# Patient Record
Sex: FEMALE | Race: WHITE
[De-identification: names, ages, dates, MRNs, and addresses within clinical notes are randomized per-mention and may not be internally consistent; named-entity substitution may affect disease eponyms.]

---

## 2019-07-18 ENCOUNTER — HOSPITAL ENCOUNTER (OUTPATIENT)
Dept: HOSPITAL 88 - OR | Age: 54
Discharge: HOME | End: 2019-07-18
Attending: INTERNAL MEDICINE
Payer: COMMERCIAL

## 2019-07-18 VITALS — SYSTOLIC BLOOD PRESSURE: 137 MMHG | DIASTOLIC BLOOD PRESSURE: 61 MMHG

## 2019-07-18 DIAGNOSIS — E03.9: ICD-10-CM

## 2019-07-18 DIAGNOSIS — K64.8: ICD-10-CM

## 2019-07-18 DIAGNOSIS — E78.5: ICD-10-CM

## 2019-07-18 DIAGNOSIS — K29.70: Primary | ICD-10-CM

## 2019-07-18 DIAGNOSIS — K20.9: ICD-10-CM

## 2019-07-18 DIAGNOSIS — K63.89: ICD-10-CM

## 2019-07-18 DIAGNOSIS — K62.1: ICD-10-CM

## 2019-07-18 DIAGNOSIS — I10: ICD-10-CM

## 2019-07-18 DIAGNOSIS — D72.820: ICD-10-CM

## 2019-07-18 DIAGNOSIS — D12.0: ICD-10-CM

## 2019-07-18 DIAGNOSIS — Z01.810: ICD-10-CM

## 2019-07-18 DIAGNOSIS — K25.9: ICD-10-CM

## 2019-07-18 DIAGNOSIS — K31.7: ICD-10-CM

## 2019-07-18 PROCEDURE — 45384 COLONOSCOPY W/LESION REMOVAL: CPT

## 2019-07-18 PROCEDURE — 45385 COLONOSCOPY W/LESION REMOVAL: CPT

## 2019-07-18 PROCEDURE — 45380 COLONOSCOPY AND BIOPSY: CPT

## 2019-07-18 PROCEDURE — 93005 ELECTROCARDIOGRAM TRACING: CPT

## 2019-07-18 PROCEDURE — 43239 EGD BIOPSY SINGLE/MULTIPLE: CPT

## 2019-07-18 NOTE — OPERATIVE REPORT
DATE OF PROCEDURE:  07/18/2019

 

SURGEON:  Ten Agustin MD

 

PROCEDURES:  Esophagogastroduodenoscopy with biopsies and colonoscopy with polypectomy

and biopsies. 

 

INDICATIONS FOR EGD:  Dyspepsia.

 

INDICATIONS FOR COLONOSCOPY:  Surveillance colonoscopy, personal history of colon polyps.

 

MEDICATIONS:  The patient was done under MAC, please see anesthesiologist's note.

 

PROCEDURE IN DETAIL:  With the patient in left lateral decubitus position, flexible

fiberoptic Olympus gastroscope was introduced into the esophagus under direct

visualization without any difficulty.  There was some patchy erythema noted in distal

esophagus.  The scope was then advanced with ease into the stomach.  Mucosa overlying

the antrum and the body revealed some patchy intense erythema and mild-to-moderate edema

and biopsies were obtained and sent to stain for H pylori.  There was an ulcerated

nodule noted in the antrum that was biopsied.  The pylorus was of normal contour and

shape, it was intubated with ease and the scope was advanced all the way to the second

portion of the duodenum.  Biopsies were obtained from the second portion as well as the

duodenal bulb to rule out sprue.  The scope was then withdrawn back into the stomach and

retroflexed.  A minute polyp was noted in the fundus that was removed per the cold

biopsy forceps.  The scope was then straightened out it was subsequently withdrawn

patient tolerated procedure well. 

 

IMPRESSION:  

1. Mild distal esophagitis.

2. Gastritis, biopsied.  Biopsies sent to stain for Helicobacter pylori.

3. Ulcerated nodule, antrum biopsied.

4. Fundal polyp, partially excised with the cold biopsy forceps.

5. Rule out sprue.

 

PLAN:  Follow up histology.  Initiate Protonix 40 mg one p.o. q.a.m. a.c.

 

PROCEDURE IN DETAIL:  The patient was then turned around after adequate lubrication of

the anal canal, flexible fiberoptic Olympus colonoscope was inserted into the rectum

with ease and advanced all the way to the cecum.  One polyp was snared from the cecal

pouch and polypectomy site was hemoclipped.  The scope was then withdrawn slowly.

Mucosa overlying the ascending colon, transverse colon, and descending colon appeared to

be within normal limits.  A focal raised area was noted in the distal sigmoid colon and

that was biopsied.  Five polyps were removed per the hot biopsy forceps from the rectum.

 The scope was then retroflexed into the distal rectum.  Small internal hemorrhoids were

noted, none of which was actively bleeding.  The scope was then straightened out, it was

subsequently withdrawn.  The patient tolerated the procedure well. 

 

IMPRESSION:  

1. Cecal polyp snared, site hemoclipped.

2. Distal sigmoid colon, focal raised area, biopsied.

3. Rectal polyps x5, hot biopsied.

4. Internal hemorrhoids, none actively bleeding.

 

PLAN:  Followup histology.  Initiate high-fiber, low-fat diet.  Initiate high-fiber

supplement.  The patient might benefit from a followup colonoscopy in 3 to 5 years. 

 

 

 

 

______________________________

Ten Agustin MD

 

[facility]

D:  07/18/2019 13:01:13

T:  07/18/2019 13:58:47

Job #:  [id number]

 

cc:            Idris Myers DO

## 2019-07-18 NOTE — XMS REPORT
Patient Summary Document

                             Created on: 2019



EDMUND GARNICA

External Reference #: [id number]

: 1965

Sex: Female



Demographics







                          Address                   [address] [place] [address]

 

                          Home Phone                [phone]

 

                          Preferred Language        Unknown

 

                          Marital Status            Unknown

 

                          Orthodox Affiliation     Unknown

 

                          Race                      Unknown

 

                                        Additional Race(s)  

 

                          Ethnic Group              Unknown





Author







                          Author                    [facility]

 

                          Address                   Unknown

 

                          Phone                     Unavailable







Care Team Providers







                    Care Team Member Name    Role                Phone

 

                    VARINDER RESTREPO    Unavailable         Unavailable







Problems

This patient has no known problems.



Allergies, Adverse Reactions, Alerts

This patient has no known allergies or adverse reactions.



Medications

This patient has no known medications.



Results







           Test Description    Test Time    Test Comments    Text Results    Atomic Results    Result

 Comments

 

                [facility]                                        [place] [address]      Patient Name: EDMUND GARNICA 
 MR #: [id number]    : 1965 Age/Sex: 52/F  Acct #: [id number] Req #: 
[id number]  Adm Physician:     Ordered by: VARINDER RESTREPO MD  Report #: [id number]
[id number]   Location: [facility]  Room/Bed:     
_____________________________________________________________________________
______________________    Procedure: [id number] [facility]  Exam 
Date:                             Exam Time:        REPORT STATUS: Signed    
Hepatobiliary Scan with Gallbladder Ejection Fraction      Clinical information:
52F with progressively worsening chronic abdominal pain      Technique: 
Following intravenous administration of 6.4 millicuries of Tc-99m   mebrofenin, 
dynamic images of the abdomen in the anterior projection were   obtained through
30 minutes.  Sincalide (CCK analog) 2.0 micrograms was   administered 
intravenously over 30 minutes with additional imaging for   determination of 
gallbladder ejection fraction.      Discussion: Perfusion of the liver is 
normal.  Extraction of tracer by the   liver parenchyma is normal.  Tracer 
appears promptly within the biliary tract.    The gallbladder begins to fill  by
9 minutes post injection of tracer and fills   adequately.  Tracer is seen in 
the small bowel by 5 minutes.  The gallbladder   ejection fraction with 
sincalide is 3% (normal greater than 40%).      Impression:       1.  Filling of
the gallbladder excludes acute cystic duct obstruction/acute   cholecystitis.   
  2.  The decreased gallbladder ejection fraction of 3% supports the clinical   
diagnosis of chronic cholecystitis/gallbladder dyskinesia.      Signed by: Dr. Yuan King M.D. on 2017 3:11 PM        Dictated By: YUAN KING MD  
Electronically Signed By: YUAN KING MD on 17 1511  Transcribed By: 
SEFERINO on 17 1511       COPY TO:   VARINDER RESTREPO MD

## 2019-09-06 ENCOUNTER — HOSPITAL ENCOUNTER (OUTPATIENT)
Dept: HOSPITAL 88 - NM | Age: 54
End: 2019-09-06
Attending: INTERNAL MEDICINE
Payer: COMMERCIAL

## 2019-09-06 DIAGNOSIS — R10.11: Primary | ICD-10-CM

## 2019-09-06 PROCEDURE — 78227 HEPATOBIL SYST IMAGE W/DRUG: CPT

## 2019-09-06 NOTE — DIAGNOSTIC IMAGING REPORT
Hepatobiliary Scan with Gallbladder Ejection Fraction



Clinical information: RUQ abdominal pain



Technique: Following intravenous administration of 6.6 millicuries of Tc-99m

mebrofenin, dynamic images of the abdomen in the anterior projection were

obtained through 26 minutes.  Sincalide (CCK analog) 2.0 micrograms was

administered intravenously over 30 minutes with additional imaging for

determination of gallbladder ejection fraction.



Discussion: Perfusion of the liver is normal.  Extraction of tracer by the

liver parenchyma is normal.  Tracer appears promptly within the biliary tract. 

The gallbladder begins to fill at 16 minutes post injection of tracer and fills

adequately.  Tracer is seen in the small bowel by 8 minutes.  There is no

contractile response by the gallbladder to the pharmacologic dose of sincalide.

 No emptying of the gallbladder occurs during the 30 minute infusion.  



Impression: 



1.  Filling of the gallbladder excludes acute cystic duct obstruction/acute

cholecystitis.



2.  The gallbladder ejection fraction is undefined as there is no emptying of

the gallbladder during the infusion of sincalide.  This absence of a

contractile response to sincalide supports the clinical diagnosis of chronic

cholecystitis/gallbladder dyskinesia.



Signed by: Dr. Asia Andre M.D. on 9/6/2019 2:34 PM

## 2019-09-27 ENCOUNTER — HOSPITAL ENCOUNTER (OUTPATIENT)
Dept: HOSPITAL 88 - CT | Age: 54
End: 2019-09-27
Attending: SURGERY
Payer: COMMERCIAL

## 2019-09-27 DIAGNOSIS — K82.8: Primary | ICD-10-CM

## 2019-09-27 LAB
ALBUMIN SERPL-MCNC: 3.6 G/DL (ref 3.5–5)
ALBUMIN/GLOB SERPL: 0.8 {RATIO} (ref 0.8–2)
ALP SERPL-CCNC: 150 IU/L (ref 40–150)
ALT SERPL-CCNC: 116 IU/L (ref 0–55)
ANION GAP SERPL CALC-SCNC: 12.3 MMOL/L (ref 8–16)
BACTERIA URNS QL MICRO: (no result) /HPF
BILIRUB UR QL: NEGATIVE
BUN SERPL-MCNC: 10 MG/DL (ref 7–26)
BUN/CREAT SERPL: 12 (ref 6–25)
CALCIUM SERPL-MCNC: 9.8 MG/DL (ref 8.4–10.2)
CHLORIDE SERPL-SCNC: 97 MMOL/L (ref 98–107)
CLARITY UR: (no result)
CO2 SERPL-SCNC: 28 MMOL/L (ref 22–29)
COLOR UR: YELLOW
DEPRECATED RBC URNS MANUAL-ACNC: (no result) /HPF (ref 0–5)
EGFRCR SERPLBLD CKD-EPI 2021: > 60 ML/MIN (ref 60–?)
EPI CELLS URNS QL MICRO: (no result) /LPF
GLOBULIN PLAS-MCNC: 4.7 G/DL (ref 2.3–3.5)
GLUCOSE SERPLBLD-MCNC: 138 MG/DL (ref 74–118)
KETONES UR QL STRIP.AUTO: NEGATIVE
LEUKOCYTE ESTERASE UR QL STRIP.AUTO: (no result)
NITRITE UR QL STRIP.AUTO: NEGATIVE
POTASSIUM SERPL-SCNC: 3.3 MMOL/L (ref 3.5–5.1)
PROT UR QL STRIP.AUTO: (no result)
SODIUM SERPL-SCNC: 134 MMOL/L (ref 136–145)
SP GR UR STRIP: 1.02 (ref 1.01–1.02)
UROBILINOGEN UR STRIP-MCNC: 0.2 MG/DL (ref 0.2–1)
WBC #/AREA URNS HPF: (no result) /HPF (ref 0–5)

## 2019-09-27 PROCEDURE — 80053 COMPREHEN METABOLIC PANEL: CPT

## 2019-09-27 PROCEDURE — 81001 URINALYSIS AUTO W/SCOPE: CPT

## 2019-09-27 PROCEDURE — 93005 ELECTROCARDIOGRAM TRACING: CPT

## 2019-09-27 PROCEDURE — 71046 X-RAY EXAM CHEST 2 VIEWS: CPT

## 2019-09-27 PROCEDURE — 36415 COLL VENOUS BLD VENIPUNCTURE: CPT

## 2019-09-27 PROCEDURE — 74177 CT ABD & PELVIS W/CONTRAST: CPT

## 2019-09-27 PROCEDURE — 85025 COMPLETE CBC W/AUTO DIFF WBC: CPT

## 2019-09-27 NOTE — DIAGNOSTIC IMAGING REPORT
CT of the abdomen and pelvis, with contrast, 9/27/2019.    





History: Biliary dyskinesia.



Comparison: None available.



Technique: Multidetector CT scanning of the abdomen and pelvis was performed

from the level of the lung bases to the inferior pubic rami after intravenous

and oral administration of contrast.  Coronal and sagittal multiplanar

reformations were obtained.





RADIATION DOSE:

     Total DLP: 796 mGy*cm

     Dose modulation, iterative reconstruction, and/or weight based adjustment

of the mA/kV was utilized to reduce the radiation dose to as low as reasonably

achievable. 



Discussion: 

LUNG BASES: There is right basilar atelectasis.



ABDOMEN: There is no evidence of cholelithiasis or gallbladder wall thickening.

The gallbladder measures 4.6 cm in diameter. Calcified granuloma are present

within the liver and spleen. The biliary tree, pancreas, adrenal glands, and

kidneys are normal. The hepatic vein, portal vein, and splenic vein are patent.

 The abdominal aorta is within normal limits for size.

      

The stomach, small bowel, and large bowel are unremarkable. A 7 mm

calcification is present at the base of the appendix, but the remainder of the

appendix opacifies with contrast and is normal in size without adjacent

inflammation.  There is no evidence of adenopathy or free fluid.   



PELVIS: The bladder, uterus, and adnexa are normal in appearance. There is no

evidence of free fluid or adenopathy. 



BONES AND SOFT TISSUES: Mild degenerative changes are present throughout the

lumbar spine without evidence of lytic or sclerotic lesion.





IMPRESSION:



1. Findings of previous granulomatous disease within the liver and spleen.

2. No evidence of cholelithiasis or gallbladder wall thickening. 

3. Small calcification of the base of the appendix, but no evidence of

appendicitis.



Signed by: Pablito Javier on 9/27/2019 1:50 PM

## 2019-09-27 NOTE — DIAGNOSTIC IMAGING REPORT
Chest, 2 views,  9/27/2019.   

 



History: Preop, cholecystectomy.



Comparison: None available.



Findings: The cardiomediastinal silhouette and pulmonary vasculature are within

normal limits. The lungs are clear without evidence of consolidation or pleural

effusion. Mild degenerative changes are present within the thoracic spine.

There are no acute osseous or soft tissue abnormalities. 



Impression: 

No acute cardiopulmonary abnormality.



Signed by: Pablito Javier on 9/27/2019 11:13 AM

## 2019-10-02 LAB
BASOPHILS # BLD AUTO: 0.1 10*3/UL (ref 0–0.1)
BASOPHILS NFR BLD AUTO: 1.2 % (ref 0–1)
DEPRECATED NEUTROPHILS # BLD AUTO: 5.3 10*3/UL (ref 2.1–6.9)
EOSINOPHIL # BLD AUTO: 0.6 10*3/UL (ref 0–0.4)
EOSINOPHIL NFR BLD AUTO: 5.8 % (ref 0–6)
ERYTHROCYTE [DISTWIDTH] IN CORD BLOOD: 14.2 % (ref 11.7–14.4)
HCT VFR BLD AUTO: 40.2 % (ref 34.2–44.1)
HGB BLD-MCNC: 12.3 G/DL (ref 12–16)
LYMPHOCYTES # BLD: 2.9 10*3/UL (ref 1–3.2)
LYMPHOCYTES NFR BLD AUTO: 30.1 % (ref 18–39.1)
MCH RBC QN AUTO: 26.7 PG (ref 28–32)
MCHC RBC AUTO-ENTMCNC: 30.6 G/DL (ref 31–35)
MCV RBC AUTO: 87.2 FL (ref 81–99)
MONOCYTES # BLD AUTO: 0.6 10*3/UL (ref 0.2–0.8)
MONOCYTES NFR BLD AUTO: 6.1 % (ref 4.4–11.3)
NEUTS SEG NFR BLD AUTO: 56.3 % (ref 38.7–80)
PLATELET # BLD AUTO: 228 X10E3/UL (ref 140–360)
RBC # BLD AUTO: 4.61 X10E6/UL (ref 3.6–5.1)

## 2019-10-04 ENCOUNTER — HOSPITAL ENCOUNTER (INPATIENT)
Dept: HOSPITAL 88 - OR | Age: 54
LOS: 2 days | Discharge: HOME | DRG: 418 | End: 2019-10-06
Attending: SURGERY | Admitting: SURGERY
Payer: COMMERCIAL

## 2019-10-04 VITALS — DIASTOLIC BLOOD PRESSURE: 57 MMHG | SYSTOLIC BLOOD PRESSURE: 90 MMHG

## 2019-10-04 VITALS — HEIGHT: 62 IN | WEIGHT: 226.01 LBS | BODY MASS INDEX: 41.59 KG/M2

## 2019-10-04 VITALS — DIASTOLIC BLOOD PRESSURE: 62 MMHG | SYSTOLIC BLOOD PRESSURE: 108 MMHG

## 2019-10-04 VITALS — SYSTOLIC BLOOD PRESSURE: 108 MMHG | DIASTOLIC BLOOD PRESSURE: 62 MMHG

## 2019-10-04 VITALS — SYSTOLIC BLOOD PRESSURE: 112 MMHG | DIASTOLIC BLOOD PRESSURE: 76 MMHG

## 2019-10-04 VITALS — DIASTOLIC BLOOD PRESSURE: 76 MMHG | SYSTOLIC BLOOD PRESSURE: 112 MMHG

## 2019-10-04 DIAGNOSIS — K76.0: ICD-10-CM

## 2019-10-04 DIAGNOSIS — K81.1: Primary | ICD-10-CM

## 2019-10-04 DIAGNOSIS — E78.5: ICD-10-CM

## 2019-10-04 DIAGNOSIS — E66.9: ICD-10-CM

## 2019-10-04 DIAGNOSIS — I10: ICD-10-CM

## 2019-10-04 DIAGNOSIS — K21.9: ICD-10-CM

## 2019-10-04 LAB
DEPRECATED APTT PLAS QN: 31.3 SECONDS (ref 23.8–35.5)
DEPRECATED INR PLAS: 1.1
HCT VFR BLD AUTO: 34.5 % (ref 34.2–44.1)
HCT VFR BLD AUTO: 36.8 % (ref 34.2–44.1)
HGB BLD-MCNC: 10.9 G/DL (ref 12–16)
HGB BLD-MCNC: 11.4 G/DL (ref 12–16)
PROTHROMBIN TIME: 14.7 SECONDS (ref 11.9–14.5)

## 2019-10-04 PROCEDURE — 85730 THROMBOPLASTIN TIME PARTIAL: CPT

## 2019-10-04 PROCEDURE — 86900 BLOOD TYPING SEROLOGIC ABO: CPT

## 2019-10-04 PROCEDURE — 88307 TISSUE EXAM BY PATHOLOGIST: CPT

## 2019-10-04 PROCEDURE — 88304 TISSUE EXAM BY PATHOLOGIST: CPT

## 2019-10-04 PROCEDURE — 0FT44ZZ RESECTION OF GALLBLADDER, PERCUTANEOUS ENDOSCOPIC APPROACH: ICD-10-PCS | Performed by: SURGERY

## 2019-10-04 PROCEDURE — 85014 HEMATOCRIT: CPT

## 2019-10-04 PROCEDURE — 80053 COMPREHEN METABOLIC PANEL: CPT

## 2019-10-04 PROCEDURE — 36415 COLL VENOUS BLD VENIPUNCTURE: CPT

## 2019-10-04 PROCEDURE — 85610 PROTHROMBIN TIME: CPT

## 2019-10-04 PROCEDURE — 85018 HEMOGLOBIN: CPT

## 2019-10-04 PROCEDURE — 85025 COMPLETE CBC W/AUTO DIFF WBC: CPT

## 2019-10-04 PROCEDURE — 86850 RBC ANTIBODY SCREEN: CPT

## 2019-10-04 RX ADMIN — PRAMIPEXOLE DIHYDROCHLORIDE SCH MG: 0.25 TABLET ORAL at 17:40

## 2019-10-04 RX ADMIN — SODIUM CHLORIDE SCH MG: 900 INJECTION INTRAVENOUS at 12:45

## 2019-10-04 RX ADMIN — PRAMIPEXOLE DIHYDROCHLORIDE SCH MG: 0.25 TABLET ORAL at 20:35

## 2019-10-04 RX ADMIN — CEFOXITIN SCH MLS/HR: 1 INJECTION, POWDER, FOR SOLUTION INTRAVENOUS at 23:30

## 2019-10-04 RX ADMIN — HYDROMORPHONE HYDROCHLORIDE PRN MG: 1 INJECTION, SOLUTION INTRAMUSCULAR; INTRAVENOUS; SUBCUTANEOUS at 19:13

## 2019-10-04 RX ADMIN — SODIUM CHLORIDE SCH MLS/HR: 9 INJECTION, SOLUTION INTRAVENOUS at 12:35

## 2019-10-04 RX ADMIN — SODIUM CHLORIDE SCH MLS/HR: 9 INJECTION, SOLUTION INTRAVENOUS at 20:35

## 2019-10-04 RX ADMIN — SODIUM CHLORIDE PRN MG: 900 INJECTION INTRAVENOUS at 19:14

## 2019-10-04 RX ADMIN — CEFOXITIN SCH MLS/HR: 1 INJECTION, POWDER, FOR SOLUTION INTRAVENOUS at 17:40

## 2019-10-04 NOTE — OPERATIVE REPORT
DATE OF PROCEDURE:  10/04/2019

 

SURGEON:  Hiro Chan MD

 

PREOPERATIVE DIAGNOSES:  

1. Chronic acalculous cholecystitis.

2. Biliary dyskinesia.

3. Morbid obesity.

4. Fatty liver infiltration.

 

POSTOPERATIVE DIAGNOSES:  

1. Chronic acalculous cholecystitis.

2. Biliary dyskinesia.

3. Morbid obesity.

4. Fatty liver infiltration.

5. Possible cirrhosis of the liver.

 

PROCEDURE PERFORMED:  Laparoscopic cholecystectomy with liver biopsy.

 

ANESTHESIA:  General.

 

ESTIMATED BLOOD LOSS:  Minimal.

 

DRAINS:  None.

 

COMPLICATIONS:  None.

 

INDICATION AND FINDINGS:  A 54-year-old morbidly obese female admitted for

cholecystectomy because of right upper quadrant pain, persistent.  She underwent a GI

workup by Dr. Ten Agustin and no source of pain was found.  She had an EGD.  She had

an ultrasound that revealed no evidence of gallstones, normal ductal anatomy.  CT scan

revealed no source of her pain, some chronic granulomatous disease of the liver.  In

addition to that, she underwent a HIDA scan that revealed a severe ejection fraction.

Her liver chemistries showed minimal elevation of the transaminases. 

 

INTRAOPERATIVE FINDINGS:  Grossly normal looking gallbladder  __________.  There was no

ductal dilatation.  The liver was grossly abnormal with nodularity and fatty liver

infiltration, consistent with severe hepatic steatosis and possibly cirrhotic

degeneration.  Hepatomegaly with thickened and heavy stiff liver. 

 

DESCRIPTION OF PROCEDURE:  With the patient lying on the operative table in the supine

position after administration of general anesthesia, she was prepped and draped for

laparoscopic cholecystectomy.  Because of her large size, we had to insufflate and

started pneumoperitoneum in the right upper quadrant midclavicular line, after which we

inflated the pneumoperitoneum to 15 mm of pressure.  We placed a 5 mm trocar there and

then placed a 10/11 trocar in the umbilical site.  We placed a 10 mm subxiphoid port

also and then a right anterior axillary line trocar was also placed.  Finally, we had to

place a left upper quadrant 5 mm trocar in order to expose the operative field because

of the large size of her liver with heavy and stiff liver.  We went ahead and then

exposed the hepatorenal ligament by traction on the gallbladder fundus and the neck, and

then in the stomach and colon with extra trocar site and we had to decompress the

gallbladder because it was tense with bile and then exposed until we were able to

identify the cystic duct.  We also identified the common bile duct and the cystic artery

and part of the liver bed fossa.  At that point, we went ahead and then transected the

cystic duct between titanium clips and also the cystic artery and then we took the

gallbladder slowly and meticulous from the gallbladder bed fossa.  The dissection was

tedious because of the stiffness and the large size of the liver, but we were able to

complete the procedure.  After we did that, we removed the gallbladder through the

umbilical port.  We irrigated the right upper quadrant and the gallbladder bed fossa and

then we saw there was some oozing coming from the gallbladder bed.  We cauterized that

and then we had some brisk venous bleeding coming from the gallbladder bed fossa and

then we had to stop the bleeding by putting multiple Surgicels and putting pressure on

the gallbladder bed fossa until the bleeding was stopped.  We took at least minimum of

45 minutes if not longer to control the bleeding, but we were able to control it with

pressure and then electrocautery.  Once we were satisfied that the gallbladder bed fossa

was no longer bleeding after observing the patient for several minutes, after we had

released all the tension, then we suctioned out the blood, placed a 10 mm flat

London-Serna drain in the right upper quadrant and foramen of Judy and brought out

through the right anterior axillary line trocar and secured there with 2-0 silk,

connected to self suction, and then we closed the umbilical site trocar with 0 Vicryl

and the subcutaneous tissue was closed in that location with 3-0 Vicryl.  The subxiphoid

and the skin of all the ports were closed using staples.  0.25% Marcaine with

epinephrine was given as local block at the end of the case.  The patient's  was

informed 

of the intraoperative findings and the patient will be admitted to the hospital to [facility]

for careful monitoring.  She had been typed and cross-matched and will be followed up

carefully. 

 

 

 

 

______________________________

MD SHERLYN Mart/[facility]

D:  10/04/2019 12:54:34

T:  10/04/2019 13:53:30

Job #:  [id number]

## 2019-10-05 VITALS — DIASTOLIC BLOOD PRESSURE: 74 MMHG | SYSTOLIC BLOOD PRESSURE: 129 MMHG

## 2019-10-05 VITALS — SYSTOLIC BLOOD PRESSURE: 115 MMHG | DIASTOLIC BLOOD PRESSURE: 79 MMHG

## 2019-10-05 VITALS — DIASTOLIC BLOOD PRESSURE: 78 MMHG | SYSTOLIC BLOOD PRESSURE: 115 MMHG

## 2019-10-05 VITALS — DIASTOLIC BLOOD PRESSURE: 61 MMHG | SYSTOLIC BLOOD PRESSURE: 95 MMHG

## 2019-10-05 VITALS — SYSTOLIC BLOOD PRESSURE: 92 MMHG | DIASTOLIC BLOOD PRESSURE: 62 MMHG

## 2019-10-05 VITALS — DIASTOLIC BLOOD PRESSURE: 79 MMHG | SYSTOLIC BLOOD PRESSURE: 115 MMHG

## 2019-10-05 VITALS — DIASTOLIC BLOOD PRESSURE: 65 MMHG | SYSTOLIC BLOOD PRESSURE: 113 MMHG

## 2019-10-05 VITALS — SYSTOLIC BLOOD PRESSURE: 95 MMHG | DIASTOLIC BLOOD PRESSURE: 61 MMHG

## 2019-10-05 LAB
ALBUMIN SERPL-MCNC: 2.8 G/DL (ref 3.5–5)
ALBUMIN/GLOB SERPL: 0.8 {RATIO} (ref 0.8–2)
ALP SERPL-CCNC: 86 IU/L (ref 40–150)
ALT SERPL-CCNC: 114 IU/L (ref 0–55)
ANION GAP SERPL CALC-SCNC: 15.2 MMOL/L (ref 8–16)
BASOPHILS # BLD AUTO: 0.1 10*3/UL (ref 0–0.1)
BASOPHILS NFR BLD AUTO: 0.5 % (ref 0–1)
BUN SERPL-MCNC: 10 MG/DL (ref 7–26)
BUN/CREAT SERPL: 14 (ref 6–25)
CALCIUM SERPL-MCNC: 8.6 MG/DL (ref 8.4–10.2)
CHLORIDE SERPL-SCNC: 107 MMOL/L (ref 98–107)
CO2 SERPL-SCNC: 20 MMOL/L (ref 22–29)
DEPRECATED NEUTROPHILS # BLD AUTO: 10.2 10*3/UL (ref 2.1–6.9)
EGFRCR SERPLBLD CKD-EPI 2021: > 60 ML/MIN (ref 60–?)
EOSINOPHIL # BLD AUTO: 0 10*3/UL (ref 0–0.4)
EOSINOPHIL NFR BLD AUTO: 0.3 % (ref 0–6)
ERYTHROCYTE [DISTWIDTH] IN CORD BLOOD: 14.5 % (ref 11.7–14.4)
GLOBULIN PLAS-MCNC: 3.5 G/DL (ref 2.3–3.5)
GLUCOSE SERPLBLD-MCNC: 105 MG/DL (ref 74–118)
HCT VFR BLD AUTO: 35.7 % (ref 34.2–44.1)
HCT VFR BLD AUTO: 36 % (ref 34.2–44.1)
HGB BLD-MCNC: 10.5 G/DL (ref 12–16)
HGB BLD-MCNC: 10.9 G/DL (ref 12–16)
LYMPHOCYTES # BLD: 2.8 10*3/UL (ref 1–3.2)
LYMPHOCYTES NFR BLD AUTO: 19.7 % (ref 18–39.1)
MCH RBC QN AUTO: 27.1 PG (ref 28–32)
MCHC RBC AUTO-ENTMCNC: 29.2 G/DL (ref 31–35)
MCV RBC AUTO: 93 FL (ref 81–99)
MONOCYTES # BLD AUTO: 1 10*3/UL (ref 0.2–0.8)
MONOCYTES NFR BLD AUTO: 6.8 % (ref 4.4–11.3)
NEUTS SEG NFR BLD AUTO: 72.1 % (ref 38.7–80)
PLATELET # BLD AUTO: 169 X10E3/UL (ref 140–360)
POTASSIUM SERPL-SCNC: 4.2 MMOL/L (ref 3.5–5.1)
RBC # BLD AUTO: 3.87 X10E6/UL (ref 3.6–5.1)
SODIUM SERPL-SCNC: 138 MMOL/L (ref 136–145)

## 2019-10-05 RX ADMIN — HYDROMORPHONE HYDROCHLORIDE PRN MG: 1 INJECTION, SOLUTION INTRAMUSCULAR; INTRAVENOUS; SUBCUTANEOUS at 19:12

## 2019-10-05 RX ADMIN — HYDROCODONE BITARTRATE AND ACETAMINOPHEN PRN EA: 7.5; 325 TABLET ORAL at 23:10

## 2019-10-05 RX ADMIN — HYDROMORPHONE HYDROCHLORIDE PRN MG: 1 INJECTION, SOLUTION INTRAMUSCULAR; INTRAVENOUS; SUBCUTANEOUS at 05:41

## 2019-10-05 RX ADMIN — SODIUM CHLORIDE PRN MG: 900 INJECTION INTRAVENOUS at 19:12

## 2019-10-05 RX ADMIN — PRAMIPEXOLE DIHYDROCHLORIDE SCH MG: 0.25 TABLET ORAL at 08:33

## 2019-10-05 RX ADMIN — LEVOTHYROXINE SODIUM SCH MCG: 100 TABLET ORAL at 05:41

## 2019-10-05 RX ADMIN — HYDROMORPHONE HYDROCHLORIDE PRN MG: 1 INJECTION, SOLUTION INTRAMUSCULAR; INTRAVENOUS; SUBCUTANEOUS at 12:25

## 2019-10-05 RX ADMIN — PRAMIPEXOLE DIHYDROCHLORIDE SCH MG: 0.25 TABLET ORAL at 17:32

## 2019-10-05 RX ADMIN — SODIUM CHLORIDE SCH MLS/HR: 9 INJECTION, SOLUTION INTRAVENOUS at 23:11

## 2019-10-05 RX ADMIN — PRAMIPEXOLE DIHYDROCHLORIDE SCH MG: 0.25 TABLET ORAL at 13:28

## 2019-10-05 RX ADMIN — PRAMIPEXOLE DIHYDROCHLORIDE SCH MG: 0.25 TABLET ORAL at 21:04

## 2019-10-05 RX ADMIN — SODIUM CHLORIDE PRN MG: 900 INJECTION INTRAVENOUS at 05:41

## 2019-10-05 RX ADMIN — HYDROMORPHONE HYDROCHLORIDE PRN MG: 1 INJECTION, SOLUTION INTRAMUSCULAR; INTRAVENOUS; SUBCUTANEOUS at 00:49

## 2019-10-05 RX ADMIN — SODIUM CHLORIDE PRN MG: 900 INJECTION INTRAVENOUS at 00:49

## 2019-10-05 RX ADMIN — SODIUM CHLORIDE SCH MLS/HR: 9 INJECTION, SOLUTION INTRAVENOUS at 05:41

## 2019-10-05 RX ADMIN — SODIUM CHLORIDE SCH MLS/HR: 9 INJECTION, SOLUTION INTRAVENOUS at 13:28

## 2019-10-05 RX ADMIN — SODIUM CHLORIDE SCH MG: 900 INJECTION INTRAVENOUS at 13:28

## 2019-10-05 RX ADMIN — SODIUM CHLORIDE PRN MG: 900 INJECTION INTRAVENOUS at 12:25

## 2019-10-06 VITALS — SYSTOLIC BLOOD PRESSURE: 121 MMHG | DIASTOLIC BLOOD PRESSURE: 58 MMHG

## 2019-10-06 VITALS — DIASTOLIC BLOOD PRESSURE: 77 MMHG | SYSTOLIC BLOOD PRESSURE: 120 MMHG

## 2019-10-06 VITALS — SYSTOLIC BLOOD PRESSURE: 120 MMHG | DIASTOLIC BLOOD PRESSURE: 77 MMHG

## 2019-10-06 VITALS — SYSTOLIC BLOOD PRESSURE: 117 MMHG | DIASTOLIC BLOOD PRESSURE: 41 MMHG

## 2019-10-06 VITALS — SYSTOLIC BLOOD PRESSURE: 118 MMHG | DIASTOLIC BLOOD PRESSURE: 68 MMHG

## 2019-10-06 LAB
BASOPHILS # BLD AUTO: 0.1 10*3/UL (ref 0–0.1)
BASOPHILS NFR BLD AUTO: 0.8 % (ref 0–1)
DEPRECATED NEUTROPHILS # BLD AUTO: 6.1 10*3/UL (ref 2.1–6.9)
EOSINOPHIL # BLD AUTO: 0.2 10*3/UL (ref 0–0.4)
EOSINOPHIL NFR BLD AUTO: 2.3 % (ref 0–6)
ERYTHROCYTE [DISTWIDTH] IN CORD BLOOD: 14.6 % (ref 11.7–14.4)
HCT VFR BLD AUTO: 32.8 % (ref 34.2–44.1)
HGB BLD-MCNC: 10.2 G/DL (ref 12–16)
LYMPHOCYTES # BLD: 2.3 10*3/UL (ref 1–3.2)
LYMPHOCYTES NFR BLD AUTO: 24.5 % (ref 18–39.1)
MCH RBC QN AUTO: 27.1 PG (ref 28–32)
MCHC RBC AUTO-ENTMCNC: 31.1 G/DL (ref 31–35)
MCV RBC AUTO: 87 FL (ref 81–99)
MONOCYTES # BLD AUTO: 0.6 10*3/UL (ref 0.2–0.8)
MONOCYTES NFR BLD AUTO: 5.9 % (ref 4.4–11.3)
NEUTS SEG NFR BLD AUTO: 64.8 % (ref 38.7–80)
PLATELET # BLD AUTO: 214 X10E3/UL (ref 140–360)
RBC # BLD AUTO: 3.77 X10E6/UL (ref 3.6–5.1)

## 2019-10-06 RX ADMIN — PRAMIPEXOLE DIHYDROCHLORIDE SCH MG: 0.25 TABLET ORAL at 13:19

## 2019-10-06 RX ADMIN — PRAMIPEXOLE DIHYDROCHLORIDE SCH MG: 0.25 TABLET ORAL at 08:55

## 2019-10-06 RX ADMIN — SODIUM CHLORIDE SCH MG: 900 INJECTION INTRAVENOUS at 13:19

## 2019-10-06 RX ADMIN — LEVOTHYROXINE SODIUM SCH MCG: 100 TABLET ORAL at 05:56

## 2019-10-06 RX ADMIN — HYDROCODONE BITARTRATE AND ACETAMINOPHEN PRN EA: 7.5; 325 TABLET ORAL at 05:56

## 2020-03-06 ENCOUNTER — HOSPITAL ENCOUNTER (OUTPATIENT)
Dept: HOSPITAL 88 - US | Age: 55
End: 2020-03-06
Attending: INTERNAL MEDICINE
Payer: COMMERCIAL

## 2020-03-06 DIAGNOSIS — R10.9: Primary | ICD-10-CM

## 2020-03-06 DIAGNOSIS — K75.81: ICD-10-CM

## 2020-03-06 LAB
BUN SERPL-MCNC: 10 MG/DL (ref 7–26)
BUN/CREAT SERPL: 13 (ref 6–25)
EGFRCR SERPLBLD CKD-EPI 2021: > 60 ML/MIN (ref 60–?)

## 2020-03-06 PROCEDURE — 74177 CT ABD & PELVIS W/CONTRAST: CPT

## 2020-03-06 PROCEDURE — 36415 COLL VENOUS BLD VENIPUNCTURE: CPT

## 2020-03-06 PROCEDURE — 82565 ASSAY OF CREATININE: CPT

## 2020-03-06 PROCEDURE — 76700 US EXAM ABDOM COMPLETE: CPT

## 2020-03-06 PROCEDURE — 84520 ASSAY OF UREA NITROGEN: CPT

## 2020-03-06 NOTE — DIAGNOSTIC IMAGING REPORT
Abdominal ultrasound.





History: Right-sided abdominal pain.



Comparison: CT abdomen 9/27/2019.



Discussion: Transverse and longitudinal images of the abdomen were obtained

demonstrating a liver of increased size but normal echogenicity measuring 18 cm

in length.  There is no focal hepatic abnormality. The portal vein is patent

with hepatopetal flow and is within normal limits measuring 11 mm in diameter. 



          

The biliary tree is within normal limits with the common bile duct measuring 3

mm in diameter. The gallbladder is absent. 

          

The kidneys are normal in size and echogenicity bilaterally without evidence of

hydronephrosis, stones, or mass. The right kidney measures 11 cm and the left

kidney measures 11.1 cm in length. 



The spleen is normal in size and appearance measuring 12.3 cm in length.  The

pancreatic head and body are visualized and are normal in appearance. The

abdominal aorta and IVC are within normal limits.  There is no evidence of free

fluid.





IMPRESSION: 



1. Mild hepatomegaly without focal hepatic abnormality.

2. Status post post cholecystectomy. Otherwise unremarkable abdominal

ultrasound.



 



Signed by: Pablito Javier on 3/6/2020 1:14 PM

## 2020-03-06 NOTE — DIAGNOSTIC IMAGING REPORT
CT of the abdomen and pelvis, with contrast, 3/6/2020.    





History: Right upper quadrant pain after cholecystectomy.



Comparison: 9/27/2019.



Technique: Multidetector CT scanning of the abdomen and pelvis was performed

from the level of the lung bases to the inferior pubic rami after intravenous

and oral administration of contrast.  Coronal and sagittal multiplanar

reformations were obtained.





RADIATION DOSE:

     Total DLP: 762 mGy*cm

     Dose modulation, iterative reconstruction, and/or weight based adjustment

of the mA/kV was utilized to reduce the radiation dose to as low as reasonably

achievable. 



Discussion: 

LUNG BASES: There is right basilar atelectasis.



ABDOMEN: Calcified granulomata are noted in the spleen. Cholecystectomy clips

are present. A small amount of fluid and air are present in the gallbladder

fossa measuring 1.5 x 2.5 cm. The liver, biliary tree, pancreas, adrenal

glands, and kidneys are normal. The hepatic vein, portal vein, and splenic vein

are patent.  The abdominal aorta is within normal limits for size.

      

The stomach, small bowel, and large bowel are unremarkable. The appendix is

visualized and is normal. There is no evidence of free fluid.  Single prominent

portacaval lymph node is present measuring 1.8 cm in short axis diameter. A

small fat-containing umbilical hernia is noted.



PELVIS: The bladder, uterus, and adnexa are normal in appearance. There is no

evidence of free fluid or adenopathy. 



BONES AND SOFT TISSUES: Degenerative changes are present throughout the lumbar

spine without evidence of lytic or sclerotic lesion.





IMPRESSION:

Status post cholecystectomy with small amount of fluid and air present within

the gallbladder fossa. Mildly enlarged portacaval lymph node is likely

reactive.

 



Signed by: Pablito Javier on 3/6/2020 3:32 PM

## 2023-06-27 ENCOUNTER — HOSPITAL ENCOUNTER (OUTPATIENT)
Dept: HOSPITAL 88 - MAMMO | Age: 58
End: 2023-06-27
Attending: FAMILY MEDICINE
Payer: COMMERCIAL

## 2023-06-27 DIAGNOSIS — Z13.820: ICD-10-CM

## 2023-06-27 DIAGNOSIS — Z12.31: Primary | ICD-10-CM

## 2023-06-27 PROCEDURE — 77080 DXA BONE DENSITY AXIAL: CPT

## 2023-06-27 PROCEDURE — 77067 SCR MAMMO BI INCL CAD: CPT

## 2023-07-03 ENCOUNTER — HOSPITAL ENCOUNTER (OUTPATIENT)
Dept: HOSPITAL 88 - US | Age: 58
End: 2023-07-03
Attending: FAMILY MEDICINE
Payer: COMMERCIAL

## 2023-07-03 DIAGNOSIS — K76.0: ICD-10-CM

## 2023-07-03 DIAGNOSIS — R74.8: Primary | ICD-10-CM

## 2023-07-03 PROCEDURE — 76705 ECHO EXAM OF ABDOMEN: CPT
